# Patient Record
Sex: FEMALE | URBAN - METROPOLITAN AREA
[De-identification: names, ages, dates, MRNs, and addresses within clinical notes are randomized per-mention and may not be internally consistent; named-entity substitution may affect disease eponyms.]

---

## 2023-12-04 ENCOUNTER — OFFICE (OUTPATIENT)
Dept: URBAN - METROPOLITAN AREA CLINIC 113 | Facility: CLINIC | Age: 88
Setting detail: OPHTHALMOLOGY
End: 2023-12-04
Payer: MEDICARE

## 2023-12-04 DIAGNOSIS — H25.13: ICD-10-CM

## 2023-12-04 DIAGNOSIS — H35.40: ICD-10-CM

## 2023-12-04 PROCEDURE — 99204 OFFICE O/P NEW MOD 45 MIN: CPT | Performed by: STUDENT IN AN ORGANIZED HEALTH CARE EDUCATION/TRAINING PROGRAM

## 2023-12-04 ASSESSMENT — REFRACTION_MANIFEST
OD_CYLINDER: -0.75
OS_CYLINDER: -3.00
OS_AXIS: 088
OD_VA1: 20/NI
OD_AXIS: 124
OS_VA1: 20/NI
OS_SPHERE: +1.50
OD_SPHERE: +0.75

## 2023-12-04 ASSESSMENT — SPHEQUIV_DERIVED
OS_SPHEQUIV: 0
OD_SPHEQUIV: 0.375
OS_SPHEQUIV: 0
OD_SPHEQUIV: 0.375

## 2023-12-04 ASSESSMENT — REFRACTION_AUTOREFRACTION
OD_CYLINDER: -0.75
OS_AXIS: 088
OD_SPHERE: +0.75
OD_AXIS: 124
OS_SPHERE: +1.50
OS_CYLINDER: -3.00

## 2023-12-04 ASSESSMENT — CONFRONTATIONAL VISUAL FIELD TEST (CVF)
OD_FINDINGS: FULL
OS_FINDINGS: FULL

## 2024-01-24 ENCOUNTER — OFFICE (OUTPATIENT)
Dept: URBAN - METROPOLITAN AREA CLINIC 105 | Facility: CLINIC | Age: 89
Setting detail: OPHTHALMOLOGY
End: 2024-01-24
Payer: MEDICARE

## 2024-01-24 DIAGNOSIS — H25.13: ICD-10-CM

## 2024-01-24 DIAGNOSIS — H25.12: ICD-10-CM

## 2024-01-24 PROCEDURE — 92136 OPHTHALMIC BIOMETRY: CPT | Mod: LT | Performed by: STUDENT IN AN ORGANIZED HEALTH CARE EDUCATION/TRAINING PROGRAM

## 2024-01-24 PROCEDURE — 92136 OPHTHALMIC BIOMETRY: CPT | Mod: TC | Performed by: STUDENT IN AN ORGANIZED HEALTH CARE EDUCATION/TRAINING PROGRAM

## 2024-01-24 PROCEDURE — 99213 OFFICE O/P EST LOW 20 MIN: CPT | Performed by: STUDENT IN AN ORGANIZED HEALTH CARE EDUCATION/TRAINING PROGRAM

## 2024-01-24 ASSESSMENT — REFRACTION_MANIFEST
OD_AXIS: 124
OS_CYLINDER: -3.00
OD_CYLINDER: -0.75
OS_SPHERE: +1.50
OS_VA1: 20/NI
OD_SPHERE: +0.75
OD_VA1: 20/NI
OS_AXIS: 088

## 2024-01-24 ASSESSMENT — REFRACTION_AUTOREFRACTION
OS_AXIS: 091
OD_SPHERE: ERR
OS_SPHERE: +1.50
OS_CYLINDER: -3.00

## 2024-01-24 ASSESSMENT — CONFRONTATIONAL VISUAL FIELD TEST (CVF)
OS_FINDINGS: FULL
OD_FINDINGS: FULL

## 2024-01-24 ASSESSMENT — SPHEQUIV_DERIVED
OS_SPHEQUIV: 0
OD_SPHEQUIV: 0.375
OS_SPHEQUIV: 0

## 2024-02-12 ENCOUNTER — ASC (OUTPATIENT)
Dept: URBAN - METROPOLITAN AREA SURGERY 8 | Facility: SURGERY | Age: 89
Setting detail: OPHTHALMOLOGY
End: 2024-02-12
Payer: MEDICARE

## 2024-02-12 DIAGNOSIS — H52.212: ICD-10-CM

## 2024-02-12 DIAGNOSIS — H25.12: ICD-10-CM

## 2024-02-12 DIAGNOSIS — H57.03: ICD-10-CM

## 2024-02-12 PROCEDURE — 66982 XCAPSL CTRC RMVL CPLX WO ECP: CPT | Mod: LT | Performed by: STUDENT IN AN ORGANIZED HEALTH CARE EDUCATION/TRAINING PROGRAM

## 2024-02-12 PROCEDURE — FEMTO FEMTOSECOND LASER: Mod: GY | Performed by: STUDENT IN AN ORGANIZED HEALTH CARE EDUCATION/TRAINING PROGRAM

## 2024-02-13 ENCOUNTER — RX ONLY (RX ONLY)
Age: 89
End: 2024-02-13

## 2024-02-13 ENCOUNTER — OFFICE (OUTPATIENT)
Dept: URBAN - METROPOLITAN AREA CLINIC 113 | Facility: CLINIC | Age: 89
Setting detail: OPHTHALMOLOGY
End: 2024-02-13
Payer: MEDICARE

## 2024-02-13 DIAGNOSIS — Z96.1: ICD-10-CM

## 2024-02-13 PROCEDURE — 99024 POSTOP FOLLOW-UP VISIT: CPT | Performed by: STUDENT IN AN ORGANIZED HEALTH CARE EDUCATION/TRAINING PROGRAM

## 2024-02-13 ASSESSMENT — REFRACTION_MANIFEST
OD_SPHERE: +0.75
OD_VA1: 20/NI
OS_AXIS: 088
OD_AXIS: 124
OD_CYLINDER: -0.75
OS_CYLINDER: -3.00
OS_SPHERE: +1.50
OS_VA1: 20/NI

## 2024-02-13 ASSESSMENT — REFRACTION_AUTOREFRACTION
OS_AXIS: 092
OS_CYLINDER: -3.50
OS_SPHERE: +1.00
OD_SPHERE: +24.75
OD_CYLINDER: SPHERE

## 2024-02-13 ASSESSMENT — CONFRONTATIONAL VISUAL FIELD TEST (CVF)
OD_FINDINGS: FULL
OS_FINDINGS: FULL

## 2024-02-13 ASSESSMENT — SPHEQUIV_DERIVED
OD_SPHEQUIV: 0.375
OS_SPHEQUIV: 0
OS_SPHEQUIV: -0.75

## 2024-02-13 ASSESSMENT — CORNEAL EDEMA CLINICAL DESCRIPTION: OS_CORNEALEDEMA: 3+

## 2024-02-13 ASSESSMENT — CORNEAL EDEMA - FOLDS/STRIAE: OS_FOLDSSTRIAE: 3+

## 2024-02-20 ENCOUNTER — OFFICE (OUTPATIENT)
Dept: URBAN - METROPOLITAN AREA CLINIC 113 | Facility: CLINIC | Age: 89
Setting detail: OPHTHALMOLOGY
End: 2024-02-20
Payer: MEDICARE

## 2024-02-20 DIAGNOSIS — Z96.1: ICD-10-CM

## 2024-02-20 DIAGNOSIS — H25.11: ICD-10-CM

## 2024-02-20 PROCEDURE — 92136 OPHTHALMIC BIOMETRY: CPT | Mod: 26,RT | Performed by: OPTOMETRIST

## 2024-02-20 PROCEDURE — 99024 POSTOP FOLLOW-UP VISIT: CPT | Performed by: OPTOMETRIST

## 2024-02-20 ASSESSMENT — SPHEQUIV_DERIVED
OS_SPHEQUIV: 0
OD_SPHEQUIV: 0.375
OS_SPHEQUIV: -0.375

## 2024-02-20 ASSESSMENT — REFRACTION_MANIFEST
OS_CYLINDER: -3.00
OD_VA1: 20/NI
OS_VA1: 20/NI
OS_AXIS: 088
OD_CYLINDER: -0.75
OS_SPHERE: +1.50
OD_AXIS: 124
OD_SPHERE: +0.75

## 2024-02-20 ASSESSMENT — REFRACTION_AUTOREFRACTION
OD_AXIS: 000
OS_CYLINDER: -1.75
OD_SPHERE: +24.75
OS_SPHERE: +0.50
OD_CYLINDER: SPH
OS_AXIS: 077

## 2024-02-20 ASSESSMENT — CORNEAL EDEMA CLINICAL DESCRIPTION: OS_CORNEALEDEMA: 1+

## 2024-02-20 ASSESSMENT — CONFRONTATIONAL VISUAL FIELD TEST (CVF)
OS_FINDINGS: FULL
OD_FINDINGS: FULL

## 2024-02-20 ASSESSMENT — CORNEAL EDEMA - FOLDS/STRIAE: OS_FOLDSSTRIAE: 1+

## 2024-02-26 ENCOUNTER — ASC (OUTPATIENT)
Dept: URBAN - METROPOLITAN AREA SURGERY 8 | Facility: SURGERY | Age: 89
Setting detail: OPHTHALMOLOGY
End: 2024-02-26
Payer: MEDICARE

## 2024-02-26 DIAGNOSIS — H57.03: ICD-10-CM

## 2024-02-26 DIAGNOSIS — H52.211: ICD-10-CM

## 2024-02-26 DIAGNOSIS — H25.11: ICD-10-CM

## 2024-02-26 PROCEDURE — 66982 XCAPSL CTRC RMVL CPLX WO ECP: CPT | Mod: 79,RT | Performed by: STUDENT IN AN ORGANIZED HEALTH CARE EDUCATION/TRAINING PROGRAM

## 2024-02-26 PROCEDURE — FEMTO FEMTOSECOND LASER: Mod: GY | Performed by: STUDENT IN AN ORGANIZED HEALTH CARE EDUCATION/TRAINING PROGRAM

## 2024-02-27 ENCOUNTER — RX ONLY (RX ONLY)
Age: 89
End: 2024-02-27

## 2024-02-27 ENCOUNTER — OFFICE (OUTPATIENT)
Dept: URBAN - METROPOLITAN AREA CLINIC 113 | Facility: CLINIC | Age: 89
Setting detail: OPHTHALMOLOGY
End: 2024-02-27
Payer: MEDICARE

## 2024-02-27 DIAGNOSIS — Z96.1: ICD-10-CM

## 2024-02-27 PROBLEM — H25.11 CATARACT SENILE NUCLEAR SCLEROSIS; RIGHT EYE: Status: RESOLVED | Noted: 2024-02-13 | Resolved: 2024-02-27

## 2024-02-27 PROCEDURE — 99024 POSTOP FOLLOW-UP VISIT: CPT | Performed by: STUDENT IN AN ORGANIZED HEALTH CARE EDUCATION/TRAINING PROGRAM

## 2024-02-27 ASSESSMENT — REFRACTION_AUTOREFRACTION
OS_CYLINDER: -1.25
OS_SPHERE: +0.25
OS_AXIS: 090
OD_AXIS: 000
OD_CYLINDER: SPH
OD_SPHERE: +24.75

## 2024-02-27 ASSESSMENT — CORNEAL EDEMA - FOLDS/STRIAE
OD_FOLDSSTRIAE: 3+ 4+
OS_FOLDSSTRIAE: ABSENT

## 2024-02-27 ASSESSMENT — REFRACTION_MANIFEST
OD_AXIS: 124
OS_VA1: 20/NI
OS_CYLINDER: -3.00
OD_CYLINDER: -0.75
OS_AXIS: 088
OD_SPHERE: +0.75
OD_VA1: 20/NI
OS_SPHERE: +1.50

## 2024-02-27 ASSESSMENT — SPHEQUIV_DERIVED
OD_SPHEQUIV: 0.375
OS_SPHEQUIV: -0.375
OS_SPHEQUIV: 0

## 2024-02-27 ASSESSMENT — CORNEAL EDEMA CLINICAL DESCRIPTION
OD_CORNEALEDEMA: 3+ 4+
OS_CORNEALEDEMA: ABSENT

## 2024-02-27 ASSESSMENT — CONFRONTATIONAL VISUAL FIELD TEST (CVF)
OD_FINDINGS: FULL
OS_FINDINGS: FULL

## 2024-03-04 ENCOUNTER — OFFICE (OUTPATIENT)
Dept: URBAN - METROPOLITAN AREA CLINIC 113 | Facility: CLINIC | Age: 89
Setting detail: OPHTHALMOLOGY
End: 2024-03-04
Payer: MEDICARE

## 2024-03-04 DIAGNOSIS — Z96.1: ICD-10-CM

## 2024-03-04 PROCEDURE — 99024 POSTOP FOLLOW-UP VISIT: CPT | Performed by: STUDENT IN AN ORGANIZED HEALTH CARE EDUCATION/TRAINING PROGRAM

## 2024-03-04 ASSESSMENT — SPHEQUIV_DERIVED
OD_SPHEQUIV: 0.375
OS_SPHEQUIV: 0

## 2024-03-04 ASSESSMENT — REFRACTION_MANIFEST
OS_CYLINDER: -3.00
OS_SPHERE: +1.50
OS_VA1: 20/NI
OD_AXIS: 124
OD_SPHERE: +0.75
OD_VA1: 20/NI
OD_CYLINDER: -0.75
OS_AXIS: 088

## 2025-01-12 ENCOUNTER — EMERGENCY (EMERGENCY)
Facility: HOSPITAL | Age: 89
LOS: 1 days | Discharge: HOME | End: 2025-01-12
Attending: EMERGENCY MEDICINE
Payer: MEDICARE

## 2025-01-12 VITALS
TEMPERATURE: 99 F | SYSTOLIC BLOOD PRESSURE: 115 MMHG | RESPIRATION RATE: 18 BRPM | DIASTOLIC BLOOD PRESSURE: 66 MMHG | OXYGEN SATURATION: 94 % | HEART RATE: 53 BPM

## 2025-01-12 LAB
A1C WITH ESTIMATED AVERAGE GLUCOSE RESULT: 15 % — HIGH (ref 4–5.6)
ALBUMIN SERPL ELPH-MCNC: 3.7 G/DL — SIGNIFICANT CHANGE UP (ref 3.3–5.2)
ALP SERPL-CCNC: 74 U/L — SIGNIFICANT CHANGE UP (ref 40–120)
ALT FLD-CCNC: 13 U/L — SIGNIFICANT CHANGE UP
ANION GAP SERPL CALC-SCNC: 12 MMOL/L — SIGNIFICANT CHANGE UP (ref 5–17)
APPEARANCE UR: ABNORMAL
APTT BLD: 27.4 SEC — SIGNIFICANT CHANGE UP (ref 24.5–35.6)
AST SERPL-CCNC: 18 U/L — SIGNIFICANT CHANGE UP
BACTERIA # UR AUTO: ABNORMAL /HPF
BASOPHILS # BLD AUTO: 0.03 K/UL — SIGNIFICANT CHANGE UP (ref 0–0.2)
BASOPHILS NFR BLD AUTO: 0.3 % — SIGNIFICANT CHANGE UP (ref 0–2)
BILIRUB SERPL-MCNC: 0.4 MG/DL — SIGNIFICANT CHANGE UP (ref 0.4–2)
BILIRUB UR-MCNC: NEGATIVE — SIGNIFICANT CHANGE UP
BUN SERPL-MCNC: 16 MG/DL — SIGNIFICANT CHANGE UP (ref 8–20)
CALCIUM SERPL-MCNC: 9.2 MG/DL — SIGNIFICANT CHANGE UP (ref 8.4–10.5)
CAST: 0 /LPF — SIGNIFICANT CHANGE UP (ref 0–4)
CHLORIDE SERPL-SCNC: 95 MMOL/L — LOW (ref 96–108)
CO2 SERPL-SCNC: 26 MMOL/L — SIGNIFICANT CHANGE UP (ref 22–29)
COLOR SPEC: YELLOW — SIGNIFICANT CHANGE UP
CREAT SERPL-MCNC: 0.95 MG/DL — SIGNIFICANT CHANGE UP (ref 0.5–1.3)
DIFF PNL FLD: ABNORMAL
EGFR: 57 ML/MIN/1.73M2 — LOW
EOSINOPHIL # BLD AUTO: 0.03 K/UL — SIGNIFICANT CHANGE UP (ref 0–0.5)
EOSINOPHIL NFR BLD AUTO: 0.3 % — SIGNIFICANT CHANGE UP (ref 0–6)
ESTIMATED AVERAGE GLUCOSE: 384 MG/DL — HIGH (ref 68–114)
GAS PNL BLDV: SIGNIFICANT CHANGE UP
GLUCOSE SERPL-MCNC: 436 MG/DL — HIGH (ref 70–99)
GLUCOSE UR QL: >=1000 MG/DL
HCT VFR BLD CALC: 40.8 % — SIGNIFICANT CHANGE UP (ref 34.5–45)
HGB BLD-MCNC: 13.8 G/DL — SIGNIFICANT CHANGE UP (ref 11.5–15.5)
IMM GRANULOCYTES NFR BLD AUTO: 0.7 % — SIGNIFICANT CHANGE UP (ref 0–0.9)
INR BLD: 1.46 RATIO — HIGH (ref 0.85–1.16)
KETONES UR-MCNC: 40 MG/DL
LEUKOCYTE ESTERASE UR-ACNC: NEGATIVE — SIGNIFICANT CHANGE UP
LYMPHOCYTES # BLD AUTO: 1.5 K/UL — SIGNIFICANT CHANGE UP (ref 1–3.3)
LYMPHOCYTES # BLD AUTO: 15.7 % — SIGNIFICANT CHANGE UP (ref 13–44)
MCHC RBC-ENTMCNC: 32.5 PG — SIGNIFICANT CHANGE UP (ref 27–34)
MCHC RBC-ENTMCNC: 33.8 G/DL — SIGNIFICANT CHANGE UP (ref 32–36)
MCV RBC AUTO: 96.2 FL — SIGNIFICANT CHANGE UP (ref 80–100)
MONOCYTES # BLD AUTO: 0.81 K/UL — SIGNIFICANT CHANGE UP (ref 0–0.9)
MONOCYTES NFR BLD AUTO: 8.5 % — SIGNIFICANT CHANGE UP (ref 2–14)
NEUTROPHILS # BLD AUTO: 7.14 K/UL — SIGNIFICANT CHANGE UP (ref 1.8–7.4)
NEUTROPHILS NFR BLD AUTO: 74.5 % — SIGNIFICANT CHANGE UP (ref 43–77)
NITRITE UR-MCNC: NEGATIVE — SIGNIFICANT CHANGE UP
PH UR: 6.5 — SIGNIFICANT CHANGE UP (ref 5–8)
PLATELET # BLD AUTO: 200 K/UL — SIGNIFICANT CHANGE UP (ref 150–400)
POTASSIUM SERPL-MCNC: 4.8 MMOL/L — SIGNIFICANT CHANGE UP (ref 3.5–5.3)
POTASSIUM SERPL-SCNC: 4.8 MMOL/L — SIGNIFICANT CHANGE UP (ref 3.5–5.3)
PROT SERPL-MCNC: 5.8 G/DL — LOW (ref 6.6–8.7)
PROT UR-MCNC: NEGATIVE MG/DL — SIGNIFICANT CHANGE UP
PROTHROM AB SERPL-ACNC: 16.4 SEC — HIGH (ref 9.9–13.4)
RBC # BLD: 4.24 M/UL — SIGNIFICANT CHANGE UP (ref 3.8–5.2)
RBC # FLD: 14.1 % — SIGNIFICANT CHANGE UP (ref 10.3–14.5)
RBC CASTS # UR COMP ASSIST: 20 /HPF — HIGH (ref 0–4)
SODIUM SERPL-SCNC: 133 MMOL/L — LOW (ref 135–145)
SP GR SPEC: >1.03 — HIGH (ref 1–1.03)
SQUAMOUS # UR AUTO: 2 /HPF — SIGNIFICANT CHANGE UP (ref 0–5)
TROPONIN T, HIGH SENSITIVITY RESULT: 20 NG/L — SIGNIFICANT CHANGE UP (ref 0–51)
TROPONIN T, HIGH SENSITIVITY RESULT: 24 NG/L — SIGNIFICANT CHANGE UP (ref 0–51)
UROBILINOGEN FLD QL: 1 MG/DL — SIGNIFICANT CHANGE UP (ref 0.2–1)
VALPROATE SERPL-MCNC: 15.8 UG/ML — LOW (ref 50–100)
WBC # BLD: 9.58 K/UL — SIGNIFICANT CHANGE UP (ref 3.8–10.5)
WBC # FLD AUTO: 9.58 K/UL — SIGNIFICANT CHANGE UP (ref 3.8–10.5)
WBC UR QL: 2 /HPF — SIGNIFICANT CHANGE UP (ref 0–5)

## 2025-01-12 PROCEDURE — 99223 1ST HOSP IP/OBS HIGH 75: CPT | Mod: FS

## 2025-01-12 PROCEDURE — 71045 X-RAY EXAM CHEST 1 VIEW: CPT | Mod: 26

## 2025-01-12 PROCEDURE — 70450 CT HEAD/BRAIN W/O DYE: CPT | Mod: 26

## 2025-01-12 PROCEDURE — 93010 ELECTROCARDIOGRAM REPORT: CPT

## 2025-01-12 PROCEDURE — 72125 CT NECK SPINE W/O DYE: CPT | Mod: 26

## 2025-01-12 RX ORDER — DEXTROSE MONOHYDRATE 25 G/50ML
12.5 INJECTION, SOLUTION INTRAVENOUS ONCE
Refills: 0 | Status: DISCONTINUED | OUTPATIENT
Start: 2025-01-12 | End: 2025-01-20

## 2025-01-12 RX ORDER — SODIUM CHLORIDE 9 MG/ML
1000 INJECTION, SOLUTION INTRAVENOUS
Refills: 0 | Status: DISCONTINUED | OUTPATIENT
Start: 2025-01-12 | End: 2025-01-20

## 2025-01-12 RX ORDER — DEXTROSE MONOHYDRATE 25 G/50ML
25 INJECTION, SOLUTION INTRAVENOUS ONCE
Refills: 0 | Status: DISCONTINUED | OUTPATIENT
Start: 2025-01-12 | End: 2025-01-20

## 2025-01-12 RX ORDER — APIXABAN 5 MG/1
5 TABLET, FILM COATED ORAL EVERY 12 HOURS
Refills: 0 | Status: DISCONTINUED | OUTPATIENT
Start: 2025-01-12 | End: 2025-01-20

## 2025-01-12 RX ORDER — DIPHENHYDRAMINE HCL 25 MG
25 TABLET ORAL ONCE
Refills: 0 | Status: COMPLETED | OUTPATIENT
Start: 2025-01-12 | End: 2025-01-12

## 2025-01-12 RX ORDER — INSULIN LISPRO 100/ML
10 VIAL (ML) SUBCUTANEOUS ONCE
Refills: 0 | Status: COMPLETED | OUTPATIENT
Start: 2025-01-12 | End: 2025-01-12

## 2025-01-12 RX ORDER — INSULIN GLARGINE-YFGN 100 [IU]/ML
15 INJECTION, SOLUTION SUBCUTANEOUS AT BEDTIME
Refills: 0 | Status: DISCONTINUED | OUTPATIENT
Start: 2025-01-12 | End: 2025-01-20

## 2025-01-12 RX ORDER — METFORMIN 850 MG/1
500 TABLET ORAL ONCE
Refills: 0 | Status: COMPLETED | OUTPATIENT
Start: 2025-01-12 | End: 2025-01-12

## 2025-01-12 RX ORDER — DONEPEZIL HYDROCHLORIDE 5 MG/1
10 TABLET, FILM COATED ORAL AT BEDTIME
Refills: 0 | Status: DISCONTINUED | OUTPATIENT
Start: 2025-01-12 | End: 2025-01-20

## 2025-01-12 RX ORDER — CARVEDILOL 25 MG/1
3.12 TABLET, FILM COATED ORAL EVERY 12 HOURS
Refills: 0 | Status: DISCONTINUED | OUTPATIENT
Start: 2025-01-12 | End: 2025-01-20

## 2025-01-12 RX ORDER — INSULIN HUMAN 100 [IU]/ML
6 INJECTION, SOLUTION SUBCUTANEOUS ONCE
Refills: 0 | Status: COMPLETED | OUTPATIENT
Start: 2025-01-12 | End: 2025-01-12

## 2025-01-12 RX ORDER — SODIUM CHLORIDE 9 MG/ML
500 INJECTION, SOLUTION INTRAMUSCULAR; INTRAVENOUS; SUBCUTANEOUS ONCE
Refills: 0 | Status: COMPLETED | OUTPATIENT
Start: 2025-01-12 | End: 2025-01-12

## 2025-01-12 RX ORDER — DEXTROSE MONOHYDRATE 25 G/50ML
15 INJECTION, SOLUTION INTRAVENOUS ONCE
Refills: 0 | Status: DISCONTINUED | OUTPATIENT
Start: 2025-01-12 | End: 2025-01-20

## 2025-01-12 RX ORDER — GLUCAGON INJECTION, SOLUTION 0.5 MG/.1ML
1 INJECTION, SOLUTION SUBCUTANEOUS ONCE
Refills: 0 | Status: DISCONTINUED | OUTPATIENT
Start: 2025-01-12 | End: 2025-01-20

## 2025-01-12 RX ORDER — DIVALPROEX SODIUM 500 MG/1
250 TABLET, DELAYED RELEASE ORAL DAILY
Refills: 0 | Status: DISCONTINUED | OUTPATIENT
Start: 2025-01-12 | End: 2025-01-20

## 2025-01-12 RX ORDER — DIVALPROEX SODIUM 500 MG/1
250 TABLET, DELAYED RELEASE ORAL ONCE
Refills: 0 | Status: COMPLETED | OUTPATIENT
Start: 2025-01-12 | End: 2025-01-12

## 2025-01-12 RX ADMIN — DIVALPROEX SODIUM 250 MILLIGRAM(S): 500 TABLET, DELAYED RELEASE ORAL at 20:34

## 2025-01-12 RX ADMIN — DONEPEZIL HYDROCHLORIDE 10 MILLIGRAM(S): 5 TABLET, FILM COATED ORAL at 23:01

## 2025-01-12 RX ADMIN — METFORMIN 500 MILLIGRAM(S): 850 TABLET ORAL at 19:31

## 2025-01-12 RX ADMIN — Medication 25 MILLIGRAM(S): at 20:35

## 2025-01-12 RX ADMIN — Medication 10 UNIT(S): at 19:17

## 2025-01-12 RX ADMIN — INSULIN HUMAN 6 UNIT(S): 100 INJECTION, SOLUTION SUBCUTANEOUS at 17:42

## 2025-01-12 RX ADMIN — SODIUM CHLORIDE 500 MILLILITER(S): 9 INJECTION, SOLUTION INTRAMUSCULAR; INTRAVENOUS; SUBCUTANEOUS at 15:02

## 2025-01-12 RX ADMIN — INSULIN GLARGINE-YFGN 15 UNIT(S): 100 INJECTION, SOLUTION SUBCUTANEOUS at 23:01

## 2025-01-12 RX ADMIN — SODIUM CHLORIDE 500 MILLILITER(S): 9 INJECTION, SOLUTION INTRAMUSCULAR; INTRAVENOUS; SUBCUTANEOUS at 17:04

## 2025-01-12 NOTE — ED ADULT NURSE REASSESSMENT NOTE - NS ED NURSE REASSESS COMMENT FT1
Assumed care from RN . Patient is alert and oriented x0-1 with baseline confusion. Patient was uncooperative and attempting to get out of bed. Patient pulled IV out of right arm. CHARISMA Álvarez and Charge nurse notified, safety sit now in place. Patient now resting comfortably in bed. No acute distress noted. No additional orders at this time. will reassess BGL at 0030. NSR lead II. Primafit placed on patient and attached to suction cannister. Awaiting PT evaluation.

## 2025-01-12 NOTE — ED PROVIDER NOTE - ATTENDING CONTRIBUTION TO CARE
I, Saroj Guo, performed a face to face bedside interview with this patient regarding history of present illness, and completed an independent physical examination. I personally made/approved the management plan and take responsibility for the patient management. I have communicated the patient’s plan of care and disposition with the resident.  89 year old female with PMH dementia, afib on eliquis, hypothyroid presents with fall. As per daughter, opt was walking out of bathroom when her legs buckled and gave out on her. Daughter guadalupe snot believe that the pt hit her head. No LOC, chest pain, palpitations. pt currently with no complaints  Gen: NAD, well appearing  CV: RRR  Pul: CTA b/l  Abd: Soft, non-distended, non-tender  Neuro: no focal deficits  Pt found to have uncontrolled hyperglycemia without signs of DKA, placed on obs for glycemic control

## 2025-01-12 NOTE — ED CDU PROVIDER INITIAL DAY NOTE - DETAILS
Pt living in assisted living presents tot he ED for mechanical trip and fall with no acute injury, found to be hyperglycemic Hx of DMII that she has not been on meds for the last 5 yrs, PT placed in obs for dc planing and initiation of insulin.

## 2025-01-12 NOTE — ED ADULT NURSE REASSESSMENT NOTE - NS ED NURSE REASSESS COMMENT FT1
Pt report given to Emanuel ORTEGA. Pt moved from a11r to a7r. Pt on cardiac monitor with updated VS. Pt plan of care ongoing.

## 2025-01-12 NOTE — ED PROVIDER NOTE - PHYSICAL EXAMINATION
Good postoperative appearance. VITALS: reviewed  GEN: No apparent distress  HEAD/EYES: NC/AT, PERRL, EOMI, anicteric sclerae, no conjunctival pallor  ENT: mucus membranes moist, trachea midline, no JVD, neck is supple  RESP: lungs CTA with equal breath sounds bilaterally, chest wall nontender and atraumatic  CV: heart with reg rhythm S1, S2, no murmur; distal pulses intact and symmetric bilaterally  ABDOMEN: normoactive bowel sounds, soft, nondistended, nontender, no palpable masses  : no CVAT  MSK: extremities atraumatic and nontender, no edema, no asymmetry. the back is without midline or lateral tenderness, there is no spinal deformity or stepoff and the back is ranged painlessly. the neck has no midline tenderness, deformity, or stepoff, and is ranged painlessly.  SKIN: warm, dry, no rash, no bruising, no cyanosis.  NEURO: alert, mentating appropriately, no facial asymmetry. gross sensation, motor, coordination are intact  PSYCH: Affect appropriate

## 2025-01-12 NOTE — ED ADULT NURSE NOTE - OBJECTIVE STATEMENT
Pt received A&Ox3, baseline dementia, at baseline. As per daughter, pt has witnessed fall at NH. Daughter states pt was tired and legs gave out. Denies head strike or LOC. + Xarelto. Respirations even & unlabored. NAD. Pt made aware of plan of care and verbalized understanding.

## 2025-01-12 NOTE — ED ADULT TRIAGE NOTE - CHIEF COMPLAINT QUOTE
Pt with PMHx dementia BIBEMS from home s/p witnessed fall from standing height. Pt does not remember the fall but is c/o neck pain, c-collar placed PTA. On Eliquis but daughter denies head strike and LOC.

## 2025-01-12 NOTE — ED CDU PROVIDER INITIAL DAY NOTE - OBJECTIVE STATEMENT
A 89 yo F with pmh hypothyroid, Afib on Eliquis, prediabetes, HTN, presents to the ED for witnessed fall from standing and hit bilateral knee on the floor. Pt is from [facility], Pt is using a walker usually. Daughter who witnessed the episode today reports that Pt walked to bathroom without a walker, became dizzy, and fall as above. Daughter reports Pt did not hit head or hit back because she caught Pt. Denies fevers, chills, headache, chest pain, palpitations, shortness of breath, cough, nausea, vomiting, diarrhea, hematuria, dysuria, dark stools, focal neurologic symptoms.

## 2025-01-12 NOTE — ED CDU PROVIDER INITIAL DAY NOTE - ATTENDING APP SHARED VISIT CONTRIBUTION OF CARE
Pt living in assisted living presents tot he ED for mechanical trip and fall with no acute injury, found to be hyperglycemic Hx of DMII that she has not been on meds for the last 5 yrs, PT placed in obs for dc planing and initiation of insulin. pe awake alert heent neck supple cor s1 s2 lung clear abd soft nontender neuro nonfocal dx hyperglycemia; endocrine consult

## 2025-01-12 NOTE — ED PROVIDER NOTE - OBJECTIVE STATEMENT
A 89 yo F with pmh hypothyroid, previously Afib, currently on Eliquis, prediabetes, HTN, presents to the ED for witnessed fall from standing and hit bilateral knee on the floor. A 89 yo F with pmh hypothyroid, previously Afib, currently on Eliquis, prediabetes, HTN, presents to the ED for witnessed fall from standing and hit bilateral knee on the floor. Pt is from [facility], Pt is using a walker usually. Daughter who witnessed the episode today reports that Pt walked to bathroom without a walker, became dizzy, and fall as above. Daughter reports Pt did not hit head or hit back because she caught Pt. Denies fevers, chills, headache, chest pain, palpitations, shortness of breath, cough, nausea, vomiting, diarrhea, hematuria, dysuria, dark stools, focal neurologic symptoms. A 89 yo F with pmh hypothyroid, Afib on Eliquis, prediabetes, HTN, presents to the ED for witnessed fall from standing and hit bilateral knee on the floor. Pt is from [facility], Pt is using a walker usually. Daughter who witnessed the episode today reports that Pt walked to bathroom without a walker, became dizzy, and fall as above. Daughter reports Pt did not hit head or hit back because she caught Pt. Denies fevers, chills, headache, chest pain, palpitations, shortness of breath, cough, nausea, vomiting, diarrhea, hematuria, dysuria, dark stools, focal neurologic symptoms.

## 2025-01-12 NOTE — ED PROVIDER NOTE - CLINICAL SUMMARY MEDICAL DECISION MAKING FREE TEXT BOX
A 89 yo F with pmh hypothyroid, previously Afib, currently on Eliquis, prediabetes, HTN, presents to the ED for witnessed fall from standing and hit bilateral knee on the floor. Pt is from [facility], Pt is using a walker usually. Daughter who witnessed the episode today reports that Pt walked to bathroom without a walker, became dizzy, and fall as above. Daughter reports Pt did not hit head or hit back because she caught Pt. Denies fevers, chills, headache, chest pain, palpitations, shortness of breath, cough, nausea, vomiting, diarrhea, hematuria, dysuria, dark stools, focal neurologic symptoms.  Will check CTH c-spine. Will check cbc, cmp, Trop T, coags, ECG, CXR. Reassess.

## 2025-01-12 NOTE — ED CDU PROVIDER INITIAL DAY NOTE - CLINICAL SUMMARY MEDICAL DECISION MAKING FREE TEXT BOX
PT placed in OBS, has had no acute incidents or complaints while in CDU PT is stable. PT Placed in OBS for DC planing, Initiation of insulin

## 2025-01-12 NOTE — ED CDU PROVIDER INITIAL DAY NOTE - PHYSICAL EXAMINATION
VITALS: reviewed  GEN: No apparent distress  HEAD/EYES: NC/AT, PERRL, EOMI, anicteric sclerae, no conjunctival pallor  ENT: mucus membranes moist, trachea midline, no JVD, neck is supple  RESP: lungs CTA with equal breath sounds bilaterally, chest wall nontender and atraumatic  CV: heart with reg rhythm S1, S2, no murmur; distal pulses intact and symmetric bilaterally  ABDOMEN: normoactive bowel sounds, soft, nondistended, nontender, no palpable masses  : no CVAT  MSK: extremities atraumatic and nontender, no edema, no asymmetry. the back is without midline or lateral tenderness, there is no spinal deformity or stepoff and the back is ranged painlessly. the neck has no midline tenderness, deformity, or stepoff, and is ranged painlessly.  SKIN: warm, dry, no rash, no bruising, no cyanosis.  NEURO: alert, mentating appropriately, no facial asymmetry. gross sensation, motor, coordination are intact  PSYCH: Affect appropriate

## 2025-01-13 LAB
GLUCOSE BLDC GLUCOMTR-MCNC: 162 MG/DL — HIGH (ref 70–99)
GLUCOSE BLDC GLUCOMTR-MCNC: 197 MG/DL — HIGH (ref 70–99)
GLUCOSE BLDC GLUCOMTR-MCNC: 280 MG/DL — HIGH (ref 70–99)
GLUCOSE BLDC GLUCOMTR-MCNC: 289 MG/DL — HIGH (ref 70–99)

## 2025-01-13 PROCEDURE — 99232 SBSQ HOSP IP/OBS MODERATE 35: CPT | Mod: FS

## 2025-01-13 PROCEDURE — 99222 1ST HOSP IP/OBS MODERATE 55: CPT

## 2025-01-13 RX ORDER — INSULIN LISPRO 100/ML
0 VIAL (ML) SUBCUTANEOUS
Qty: 100 | Refills: 1
Start: 2025-01-13

## 2025-01-13 RX ORDER — DIVALPROEX SODIUM 500 MG/1
1 TABLET, DELAYED RELEASE ORAL
Refills: 0 | DISCHARGE

## 2025-01-13 RX ORDER — ACETAMINOPHEN 80 MG/.8ML
2 SOLUTION/ DROPS ORAL
Refills: 0 | DISCHARGE

## 2025-01-13 RX ORDER — ISOPROPYL ALCOHOL, BENZOCAINE .7; .06 ML/ML; ML/ML
0 SWAB TOPICAL
Qty: 100 | Refills: 1
Start: 2025-01-13

## 2025-01-13 RX ORDER — INSULIN LISPRO 100/ML
VIAL (ML) SUBCUTANEOUS
Refills: 0 | Status: DISCONTINUED | OUTPATIENT
Start: 2025-01-13 | End: 2025-01-20

## 2025-01-13 RX ORDER — VITAMIN A 10000 UNIT
1 TABLET ORAL
Refills: 0 | DISCHARGE

## 2025-01-13 RX ORDER — INSULIN LISPRO 100/ML
0 VIAL (ML) SUBCUTANEOUS
Qty: 100 | Refills: 1
Start: 2025-01-13 | End: 2025-03-13

## 2025-01-13 RX ORDER — CYANOCOBALAMIN 1000 UG/ML
1 INJECTION, SOLUTION INTRAMUSCULAR; SUBCUTANEOUS
Refills: 0 | DISCHARGE

## 2025-01-13 RX ORDER — INSULIN GLARGINE-YFGN 100 [IU]/ML
15 INJECTION, SOLUTION SUBCUTANEOUS
Qty: 100 | Refills: 1
Start: 2025-01-13

## 2025-01-13 RX ORDER — LACTOBACILLUS ACIDOPHILUS/PECT 75 MM-100
1 CAPSULE ORAL
Refills: 0 | DISCHARGE

## 2025-01-13 RX ORDER — LEVOTHYROXINE SODIUM 175 UG/1
1 TABLET ORAL
Refills: 0 | DISCHARGE

## 2025-01-13 RX ORDER — APIXABAN 5 MG/1
1 TABLET, FILM COATED ORAL
Refills: 0 | DISCHARGE

## 2025-01-13 RX ORDER — INSULIN GLARGINE-YFGN 100 [IU]/ML
0 INJECTION, SOLUTION SUBCUTANEOUS
Qty: 100 | Refills: 1
Start: 2025-01-13

## 2025-01-13 RX ORDER — CHOLECALCIFEROL (VITAMIN D3) 10 MCG
1 TABLET ORAL
Refills: 0 | DISCHARGE

## 2025-01-13 RX ORDER — CARVEDILOL 25 MG/1
1 TABLET, FILM COATED ORAL
Refills: 0 | DISCHARGE

## 2025-01-13 RX ORDER — DONEPEZIL HYDROCHLORIDE 5 MG/1
1 TABLET, FILM COATED ORAL
Refills: 0 | DISCHARGE

## 2025-01-13 RX ADMIN — APIXABAN 5 MILLIGRAM(S): 5 TABLET, FILM COATED ORAL at 17:06

## 2025-01-13 RX ADMIN — Medication 20 MILLIGRAM(S): at 11:46

## 2025-01-13 RX ADMIN — CARVEDILOL 3.12 MILLIGRAM(S): 25 TABLET, FILM COATED ORAL at 06:56

## 2025-01-13 RX ADMIN — DONEPEZIL HYDROCHLORIDE 10 MILLIGRAM(S): 5 TABLET, FILM COATED ORAL at 21:15

## 2025-01-13 RX ADMIN — DIVALPROEX SODIUM 250 MILLIGRAM(S): 500 TABLET, DELAYED RELEASE ORAL at 11:46

## 2025-01-13 RX ADMIN — Medication 3: at 16:21

## 2025-01-13 RX ADMIN — APIXABAN 5 MILLIGRAM(S): 5 TABLET, FILM COATED ORAL at 06:56

## 2025-01-13 RX ADMIN — CARVEDILOL 3.12 MILLIGRAM(S): 25 TABLET, FILM COATED ORAL at 17:06

## 2025-01-13 RX ADMIN — INSULIN GLARGINE-YFGN 15 UNIT(S): 100 INJECTION, SOLUTION SUBCUTANEOUS at 21:15

## 2025-01-13 NOTE — ED ADULT NURSE REASSESSMENT NOTE - NS ED NURSE REASSESS COMMENT FT1
Assumed care of patient at 19:10 from SHANNON Mcclendon. Patient A&O x2, denies pain, no complaints of CP/SOB, no acute distress noted. Patient lying comfortably in bed, bed locked and in lowest position, safety maintained. Patient updated on plan of care. Patient remains on CM in NSR.

## 2025-01-13 NOTE — CHART NOTE - NSCHARTNOTEFT_GEN_A_CORE
WAQAS Note: WAQAS made aware by ED Provider that pt is medically stable for d/c back to the [facility] in [place]. WAQAS called the [facility] and spoke to Roseline in Nursing ([phone]). Roseline spoke to [facility] ED PA Kodi and she reports that pt cannot return to the [facility] at this time as pt was put on insulin here at [facility] and they do not have an insulin presciption for the pt. WAQAS followed up with ED PA who reports that he put prescription in to [facility] in [place] for all of pt's needed medications including insulin. WAQAS called Roseline back and Roseline reported that the [facility] only gets one delivery of medication per day at 8 PM. As per Roseline, if pt's insulin prescription is not obtained today, 1/13, at 8 PM, pt will need to stay at [facility] until tomorrow 1/14. WAQAS called [facility] ([phone]) and spoke to staff who reported that they do have insulin prescription for the pt in their records, but that it will only be delivered tomorrow, 1/14 to the [facility]. WAQAS updated ED PA with information. Pt will need to stay in [facility] ED until insulin prescription is delivered to the [facility]. WAQAS called Roseline after WAQAS spoke to the pharmacy. As per Roseline, she will call the pharmacy tomorrow and see if she can get pt's medication rush delivered. WAQAS will follow up tomorrow for d/c back to the [facility].

## 2025-01-13 NOTE — ED CDU PROVIDER SUBSEQUENT DAY NOTE - ATTENDING APP SHARED VISIT CONTRIBUTION OF CARE
PT placed in OBS, has had no acute incidents or complaints while in CDU PT is stable. PT Placed in OBS for management of hyperglicymia, and dc planing for recent mechanical fall.    I, Santo Ortiz, performed the initial face to face bedside interview with this patient regarding history of present illness, review of symptoms and relevant past medical, social and family history.  I completed an independent physical examination.  I was the initial provider who evaluated this patient. I have signed out the follow up of any pending tests (i.e. labs, radiological studies) to the ACP.  I have communicated the patient’s plan of care and disposition with the ACP. PT placed in OBS, has had no acute incidents or complaints while in CDU PT is stable. PT Placed in OBS for management of hyperglicymia, and dc planing for recent mechanical fall.    I, Santo Ortiz, performed the initial face to face bedside interview with this patient regarding history of present illness, review of symptoms and relevant past medical, social and family history.  I completed an independent physical examination.  I was the initial provider who evaluated this patient. I have signed out the follow up of any pending tests (i.e. labs, radiological studies) to the ACP.  I have communicated the patient’s plan of care and disposition with the ACP..

## 2025-01-13 NOTE — CONSULT NOTE ADULT - ASSESSMENT
89F with PMHx hypothyroid, Afib on Eliquis, prediabetes, HTN, presents to the ED for witnessed fall. Endocrine consulted for new onset diabetes, a1c 15%.    1. New onset diabetes, a1c 15%  - Glucoses improving  - Continue lantus 15 units daily and correction scale  - Will need insulin on discharge due to elevated a1c  - Monitor FS AC&HS  - To be discharged on current regimen    2. Hypothyroid  - Not on thyroid supplement, check TSH/FT4    3. Hx of atrial fibrillation  - Continue Eliquis

## 2025-01-13 NOTE — ED ADULT NURSE REASSESSMENT NOTE - NS ED NURSE REASSESS COMMENT FT1
Pt is resting in stretcher comfortably at this time. NAD. VSS. Respirations even and unlabored. Pt safety maintained. Pt denies any complaints at this time. Plan of care on going. Pt is awaiting insulin prescription to be sent to NH. Pt may be discharged tonight or tomorrow morning after prescription is sent/received.

## 2025-01-13 NOTE — ED ADULT NURSE REASSESSMENT NOTE - NS ED NURSE REASSESS COMMENT FT1
Assumed pt care at 1515. Pt is resting in stretcher comfortably at this time. NAD. VSS. Respirations even and unlabored. Pt safety maintained. Pt denies any complaints at this time. Plan of care on going. Pt awaiting disposition.

## 2025-01-13 NOTE — ED CDU PROVIDER SUBSEQUENT DAY NOTE - HISTORY
PT placed in OBS, has had no acute incidents or complaints while in CDU PT is stable. PT Placed in OBS for management of hyperglicymia, and dc planing for recent mechanical fall.

## 2025-01-13 NOTE — PHYSICAL THERAPY INITIAL EVALUATION ADULT - PERTINENT HX OF CURRENT PROBLEM, REHAB EVAL
A 89 yo F with pmh hypothyroid, Afib on Eliquis, prediabetes, HTN, presents to the ED for witnessed fall from standing and hit bilateral knee on the floor. Pt is from [facility], Pt is using a walker usually. Daughter who witnessed the episode today reports that Pt walked to bathroom without a walker, became dizzy, and fall as above. Daughter reports Pt did not hit head or hit back because she caught Pt. Denies fevers, chills, headache, chest pain, palpitations, shortness of breath, cough, nausea, vomiting, diarrhea, hematuria, dysuria, dark stools, focal neurologic symptoms.

## 2025-01-13 NOTE — ED CDU PROVIDER SUBSEQUENT DAY NOTE - PROGRESS NOTE DETAILS
seen the pt at the bed side again - pt daughter at the bed side as well. explained about the endocrine recommendation and insuline . made the C/M aware since pt is from [facility] - to contact NH for possible d.c the pt called and Spoke with RN Chely RN At [phone] . requested  free style liber and insulin sent to [facility]. that insulin sent . they are waiting the medication get delivered to the NH. unable to sent the insulin [facility] depend on [facility] . peggy roman Received during sign out. Patient in NAD. Respirations even and unlabored. Resting comfortably. Received call from WAQAS Grey. Insulin will not be able to be delivered from pharmacy until tomorrow. Anticipate morning discharge.

## 2025-01-13 NOTE — ED ADULT NURSE REASSESSMENT NOTE - NS ED NURSE REASSESS COMMENT FT1
Assumed care of patient at 07:15 from RN SANDOR Avila. Charting as noted. Patient AA&Ox4, resp even/unlabored, presented to ED s/p witnessed fall. At time of assessment, patient denies pain/discomfort, denies CP/SOB. Patient updated on the plan of care, awaiting physical therapy evaluation. Stretcher locked in lowest position, IV site flushed w/ NS. No redness, swelling or pain noted to site. No signs of acute distress noted, safety maintained, safety sitter at bed side. Pt remains on CM in NSR, rate 70, SpO2 98% on room air. Assumed care of patient at 07:15 from RN SANDOR Avila. Charting as noted. Patient AA&Ox2-3 with periods of confusion, resp even/unlabored, presented to ED s/p witnessed fall. At time of assessment, patient denies pain/discomfort, denies CP/SOB. Patient updated on the plan of care, awaiting physical therapy evaluation. Stretcher locked in lowest position. Pt received without IV access s/p self removing, per MD Walton and CHARISMA Timmons, IV access not necessary at this time. No signs of acute distress noted, safety maintained, safety sitter at bed side. Pt remains on CM in NSR, rate 70, SpO2 98% on room air. Assumed care of patient at 07:15 from RN SANDOR Avila. Charting as noted. Patient AA&Ox1-2 with periods of confusion, resp even/unlabored, presented to ED s/p witnessed fall. At time of assessment, patient denies pain/discomfort, denies CP/SOB. Patient updated on the plan of care, awaiting physical therapy evaluation. Stretcher locked in lowest position. Pt received without IV access s/p self removing, per MD Walton and CHARISMA Timmons, IV access not necessary at this time. No signs of acute distress noted, safety maintained, safety sitter at bed side. Pt remains on CM in NSR, rate 70, SpO2 98% on room air.

## 2025-01-13 NOTE — PHYSICAL THERAPY INITIAL EVALUATION ADULT - ADDITIONAL COMMENTS
pt lives at the [facility] living where she amb with a RW per chart. pt is an unreliable historian as she reported she was at home in [place], [place].

## 2025-01-13 NOTE — ED ADULT NURSE REASSESSMENT NOTE - NS ED NURSE REASSESS COMMENT FT1
Patient is calm and resting comfortably in bed. No acute distress noted. Safety sit in place. NSR lead II. Primafit in place. Awaiting Pt evaluation and SW consult.

## 2025-01-13 NOTE — ED ADULT NURSE REASSESSMENT NOTE - NS ED NURSE REASSESS COMMENT FT1
Patient is calm and resting comfortably in bed. No acute distress noted.  at 0030. PA Himawdestiny notified. Will repeat at 0330. CHARISMA Rabagowdestiny advised to not attempt reinsertion of IV to avoid agitation. Safety sit in place. Primafit in place. Awaiting Pt evaluation and SW consult.

## 2025-01-13 NOTE — CONSULT NOTE ADULT - SUBJECTIVE AND OBJECTIVE BOX
HPI:  89 yo F with pmh hypothyroid, Afib on Eliquis, prediabetes, HTN, presents to the ED for witnessed fall from standing and hit bilateral knee on the floor. Pt is from [facility], Pt is using a walker usually. Daughter who witnessed the episode today reports that Pt walked to bathroom without a walker, became dizzy, and fall as above.    Consult for new onset diabetes, a1c 15%  Daughter endorses pt had diabetes a long time ago, then was told to stop treatment because it improved  Was not on diabetic medications recently     PAST MEDICAL & SURGICAL HISTORY:  Prediabetes  Hypothyroid  HTN (hypertension)    FAMILY HISTORY:  Diabetes in father    REVIEW OF SYSTEMS:  ROS negative    MEDICATIONS  (STANDING):  apixaban 5 milliGRAM(s) Oral every 12 hours  carvedilol 3.125 milliGRAM(s) Oral every 12 hours  dextrose 5%. 1000 milliLiter(s) (50 mL/Hr) IV Continuous <Continuous>  dextrose 5%. 1000 milliLiter(s) (100 mL/Hr) IV Continuous <Continuous>  dextrose 50% Injectable 25 Gram(s) IV Push once  dextrose 50% Injectable 12.5 Gram(s) IV Push once  dextrose 50% Injectable 25 Gram(s) IV Push once  divalproex  milliGRAM(s) Oral daily  donepezil 10 milliGRAM(s) Oral at bedtime  FLUoxetine 20 milliGRAM(s) Oral daily  glucagon  Injectable 1 milliGRAM(s) IntraMuscular once  insulin glargine Injectable (LANTUS) 15 Unit(s) SubCutaneous at bedtime    MEDICATIONS  (PRN):  dextrose Oral Gel 15 Gram(s) Oral once PRN Blood Glucose LESS THAN 70 milliGRAM(s)/deciliter    Allergies  No Known Allergies    PHYSICAL EXAM:  General: No apparent distress  Respiratory: Lungs clear bilaterally  Cardiac: +S1, S2, no m/r/g  GI: +BS, soft, + distended  Extremities: No peripheral edema  Neuro: Opens eyes, follows commands    Vital Signs Last 24 Hrs  T(C): 37 (13 Jan 2025 11:21), Max: 37 (13 Jan 2025 05:33)  T(F): 98.6 (13 Jan 2025 11:21), Max: 98.6 (13 Jan 2025 05:33)  HR: 75 (13 Jan 2025 11:21) (71 - 91)  BP: 153/82 (13 Jan 2025 11:21) (110/74 - 157/80)  BP(mean): --  RR: 18 (13 Jan 2025 11:21) (16 - 18)  SpO2: 96% (13 Jan 2025 11:21) (95% - 96%)    Parameters below as of 13 Jan 2025 11:21  Patient On (Oxygen Delivery Method): room air    LABS:                        13.8   9.58  )-----------( 200      ( 12 Jan 2025 14:54 )             40.8     01-12    133[L]  |  95[L]  |  16.0  ----------------------------<  436[H]  4.8   |  26.0  |  0.95    Ca    9.2      12 Jan 2025 14:54    TPro  5.8[L]  /  Alb  3.7  /  TBili  0.4  /  DBili  x   /  AST  18  /  ALT  13  /  AlkPhos  74  01-12    Urinalysis Basic - ( 12 Jan 2025 20:00 )    Color: Yellow / Appearance: Cloudy / SG: >1.030 / pH: x  Gluc: x / Ketone: 40 mg/dL  / Bili: Negative / Urobili: 1.0 mg/dL   Blood: x / Protein: Negative mg/dL / Nitrite: Negative   Leuk Esterase: Negative / RBC: 20 /HPF / WBC 2 /HPF   Sq Epi: x / Non Sq Epi: 2 /HPF / Bacteria: Many /HPF    LIVER FUNCTIONS - ( 12 Jan 2025 14:54 )  Alb: 3.7 g/dL / Pro: 5.8 g/dL / ALK PHOS: 74 U/L / ALT: 13 U/L / AST: 18 U/L / GGT: x           POCT Blood Glucose.: 162 mg/dL (01-13-25 @ 03:31)  POCT Blood Glucose.: 197 mg/dL (01-13-25 @ 00:31)  POCT Blood Glucose.: 479 mg/dL (01-12-25 @ 18:48)  POCT Blood Glucose.: 420 mg/dL (01-12-25 @ 17:13)

## 2025-01-14 VITALS
TEMPERATURE: 98 F | SYSTOLIC BLOOD PRESSURE: 106 MMHG | HEART RATE: 85 BPM | OXYGEN SATURATION: 98 % | RESPIRATION RATE: 18 BRPM | DIASTOLIC BLOOD PRESSURE: 74 MMHG

## 2025-01-14 LAB
CULTURE RESULTS: SIGNIFICANT CHANGE UP
GLUCOSE BLDC GLUCOMTR-MCNC: 178 MG/DL — HIGH (ref 70–99)
GLUCOSE BLDC GLUCOMTR-MCNC: 227 MG/DL — HIGH (ref 70–99)
SPECIMEN SOURCE: SIGNIFICANT CHANGE UP
T4 FREE SERPL-MCNC: 1 NG/DL — SIGNIFICANT CHANGE UP (ref 0.9–1.7)
TSH SERPL-MCNC: 11.96 UIU/ML — HIGH (ref 0.27–4.2)

## 2025-01-14 PROCEDURE — G0378: CPT

## 2025-01-14 PROCEDURE — 70450 CT HEAD/BRAIN W/O DYE: CPT | Mod: MC

## 2025-01-14 PROCEDURE — 87086 URINE CULTURE/COLONY COUNT: CPT

## 2025-01-14 PROCEDURE — 93005 ELECTROCARDIOGRAM TRACING: CPT

## 2025-01-14 PROCEDURE — 99285 EMERGENCY DEPT VISIT HI MDM: CPT | Mod: 25

## 2025-01-14 PROCEDURE — 71045 X-RAY EXAM CHEST 1 VIEW: CPT

## 2025-01-14 PROCEDURE — 84443 ASSAY THYROID STIM HORMONE: CPT

## 2025-01-14 PROCEDURE — 36415 COLL VENOUS BLD VENIPUNCTURE: CPT

## 2025-01-14 PROCEDURE — 84439 ASSAY OF FREE THYROXINE: CPT

## 2025-01-14 PROCEDURE — 82962 GLUCOSE BLOOD TEST: CPT

## 2025-01-14 PROCEDURE — 99239 HOSP IP/OBS DSCHRG MGMT >30: CPT

## 2025-01-14 PROCEDURE — 96372 THER/PROPH/DIAG INJ SC/IM: CPT

## 2025-01-14 PROCEDURE — 72125 CT NECK SPINE W/O DYE: CPT | Mod: MC

## 2025-01-14 RX ORDER — LEVOTHYROXINE SODIUM 175 UG/1
50 TABLET ORAL DAILY
Refills: 0 | Status: DISCONTINUED | OUTPATIENT
Start: 2025-01-14 | End: 2025-01-20

## 2025-01-14 RX ORDER — INSULIN LISPRO 100/ML
3 VIAL (ML) SUBCUTANEOUS
Refills: 0 | Status: DISCONTINUED | OUTPATIENT
Start: 2025-01-14 | End: 2025-01-20

## 2025-01-14 RX ORDER — INSULIN LISPRO 100/ML
0 VIAL (ML) SUBCUTANEOUS
Qty: 1 | Refills: 0
Start: 2025-01-14

## 2025-01-14 RX ADMIN — Medication 1: at 08:13

## 2025-01-14 RX ADMIN — CARVEDILOL 3.12 MILLIGRAM(S): 25 TABLET, FILM COATED ORAL at 05:37

## 2025-01-14 RX ADMIN — Medication 20 MILLIGRAM(S): at 12:57

## 2025-01-14 RX ADMIN — Medication 3 UNIT(S): at 12:59

## 2025-01-14 RX ADMIN — DIVALPROEX SODIUM 250 MILLIGRAM(S): 500 TABLET, DELAYED RELEASE ORAL at 12:57

## 2025-01-14 RX ADMIN — Medication 2: at 13:00

## 2025-01-14 RX ADMIN — APIXABAN 5 MILLIGRAM(S): 5 TABLET, FILM COATED ORAL at 05:37

## 2025-01-14 NOTE — ED CDU PROVIDER DISPOSITION NOTE - NSFOLLOWUPINSTRUCTIONS_ED_ALL_ED_FT
Patient education: Using insulin (The Basics)  Written by the doctors and editors at [facility]  Please read the Disclaimer at the end of this page.    What is insulin?    Insulin is a medicine that many people with diabetes use as part of their treatment. Diabetes is a disorder that disrupts the way a person's body uses sugar. This causes sugar to build up in the blood.    Insulin can lower a person's blood sugar level. It usually comes as a shot that you give yourself.    Are there different types of insulin?    Yes. All types of insulin can control blood sugar levels. But some types of insulin start working faster or last longer than other types. Many people use 2 different types of insulin each day so that their body has insulin all day and night.    How many times a day should I use insulin?    It depends. Your doctor will work with you to make a treatment plan that tells you:    ?When to use insulin    ?What type of insulin to use    ?How much insulin to use    Some people use the same amount of insulin 1 or 2 times a day, at the same time each day. But many people use insulin 3 or more times a day, usually before each meal. Using insulin 3 or more times a day can control a person's blood sugar level better.    How much insulin should I use?    Sometimes, people need to choose their dose of insulin. When choosing how much insulin to use, you need to think about:    ?What you plan to eat at the next meal    ?How much exercise you plan to do    ?What your blood sugar level is    Also, you will probably need to change your insulin dose if you:    ?Have surgery, get sick, or get pregnant    ?Eat out or travel    ?Gain or lose weight    Ask your doctor or nurse how to change your insulin dose during these times.    How do I give myself an insulin shot?    Your doctor or nurse will teach you how to give yourself an insulin shot. You will need either a prefilled insulin pen injector, or a needle and syringe to draw up insulin from a small bottle.    The pen injectors are easier to use than the older syringes and insulin bottles. They have prefilled insulin cartridges and a needle (figure 1). There is a knob at the end of the pen, which you can turn to evy the number of units you need to take. If you cannot turn the knob to set a dose, there is not enough insulin left in the pen. When this happens, start a new pen.    If you use a bottle and syringe, make sure that you use the correct syringe for your type of insulin. Using the wrong syringe with your insulin can cause a dangerous insulin overdose.    Whether you are using an injector pen or a syringe, you give yourself the insulin shot in the same way:    ?Choose a part of the body. You can use different parts of the body for an insulin shot (figure 2). Switch or "rotate" areas each time you give yourself a shot.    ?Pinch up some skin, and quickly insert the needle (figure 3).    ?Push the plunger down all of the way, and count to 5.    ?Let go of the skin, and remove the needle.    ?Throw away the needle in a container that is made for used needles. If you used a syringe, throw this away, too.    Never use another person's insulin pen (even if the needle is changed) or let another person use yours.    What is an insulin pump?    An insulin pump is a device that slowly releases insulin into the body. The insulin goes through a thin tube from the pump into the body through an opening in the skin (figure 4). The device keeps working all day and night.    What is inhaled insulin?    Inhaled insulin is an insulin powder that is breathed into the lungs. The insulin powder comes in a cartridge that can be placed into a small inhaler (like an asthma inhaler). Place the inhaler into your mouth, and when you breathe in, the powdered insulin goes into your lungs.    Most people with diabetes use insulin shots or a pump rather than inhaled insulin.    How do I know if I am using the right amount of insulin?    To know if you are using the right amount of insulin, check your blood sugar level at home. Most doctors recommend that people who use insulin with each meal check their blood sugar level at least 4 times a day.    Why do I need to check my blood sugar level?    Checking your blood sugar level is important because it can tell you:    ?If your blood sugar level gets too low or too high – If you use too much insulin, your blood sugar level can get too low. If you do not use enough insulin, your blood sugar level can get too high. Levels that are too low or too high can lead to serious problems. Talk with your doctor or nurse about what to do if your blood sugar level gets too low or too high.    ?What changes to make in your next insulin dose – Knowing your blood sugar level will help you choose your next insulin dose.    ?How well your treatment is working – One goal of diabetes treatment is to keep your blood sugar at or near your goal level. This can prevent health problems later in life.    How do I check my blood sugar level at home?    You can use a "blood glucose monitor" or a "continuous glucose monitor" ("CGM") to check your blood sugar level. Most people who use insulin can use either type of device. Your doctor or nurse will show you how to use your device.    In general:    ?Most blood glucose monitors work the same way. You prick your skin to get a drop of blood. Many people prick their fingertips (picture 1), but you can prick other parts of the body (picture 2). Then, put the drop of blood into the monitor. After a few seconds, the monitor shows your blood sugar level.    ?CGM devices measure your sugar level all of the time. With CGM, you wear a special sensor that attaches to your skin with a sticky patch (figure 5 and figure 6). It measures the sugar in the fluid under your skin. Then, it sends the information to a small box that can attach to your clothing or go in a bag. One type of CGM device can be placed under the skin and used for up to 6 months. For this type, a doctor does a minor procedure to insert the device.    If you use CGM, you might still need to check your blood sugar by pricking your skin. This is especially important when your sugar levels are changing quickly, or if you think that your monitor might not be working correctly. When and how often to do this depends on which device you use.

## 2025-01-14 NOTE — ED ADULT NURSE REASSESSMENT NOTE - NS ED NURSE REASSESS COMMENT FT1
Patient A&O x2, offering no complaints at this time, no acute distress noted. Patient lying in bed, safety maintained. Patient updated on plan of care. Patient remains on CM in NSR.

## 2025-01-14 NOTE — PROGRESS NOTE ADULT - SUBJECTIVE AND OBJECTIVE BOX
INTERVAL EVENTS:  Follow up diabetes management, glucoses overall improving. Pt denies acute complaints.    MEDICATIONS  (STANDING):  apixaban 5 milliGRAM(s) Oral every 12 hours  carvedilol 3.125 milliGRAM(s) Oral every 12 hours  dextrose 5%. 1000 milliLiter(s) (50 mL/Hr) IV Continuous <Continuous>  dextrose 5%. 1000 milliLiter(s) (100 mL/Hr) IV Continuous <Continuous>  dextrose 50% Injectable 25 Gram(s) IV Push once  dextrose 50% Injectable 12.5 Gram(s) IV Push once  dextrose 50% Injectable 25 Gram(s) IV Push once  divalproex  milliGRAM(s) Oral daily  donepezil 10 milliGRAM(s) Oral at bedtime  FLUoxetine 20 milliGRAM(s) Oral daily  glucagon  Injectable 1 milliGRAM(s) IntraMuscular once  insulin glargine Injectable (LANTUS) 15 Unit(s) SubCutaneous at bedtime  insulin lispro (ADMELOG) corrective regimen sliding scale   SubCutaneous three times a day before meals  insulin lispro Injectable (ADMELOG) 3 Unit(s) SubCutaneous three times a day before meals  levothyroxine 50 MICROGram(s) Oral daily    MEDICATIONS  (PRN):  dextrose Oral Gel 15 Gram(s) Oral once PRN Blood Glucose LESS THAN 70 milliGRAM(s)/deciliter    Allergies  No Known Allergies    Vital Signs Last 24 Hrs  T(C): 36.9 (14 Jan 2025 11:28), Max: 37 (13 Jan 2025 19:16)  T(F): 98.5 (14 Jan 2025 11:28), Max: 98.6 (13 Jan 2025 19:16)  HR: 68 (14 Jan 2025 11:28) (67 - 77)  BP: 139/79 (14 Jan 2025 11:28) (127/77 - 179/91)  BP(mean): --  RR: 18 (14 Jan 2025 11:28) (16 - 18)  SpO2: 97% (14 Jan 2025 11:28) (93% - 97%)    Parameters below as of 14 Jan 2025 11:28  Patient On (Oxygen Delivery Method): room air    PHYSICAL EXAM:  General: No apparent distress  Respiratory: Lungs clear bilaterally  Cardiac: +S1, S2, no m/r/g  GI: +BS, soft, non tender, non distended  Extremities: No edema  Neuro: Awake, forgetful at times     LABS:                        13.8   9.58  )-----------( 200      ( 12 Jan 2025 14:54 )             40.8     01-12    133[L]  |  95[L]  |  16.0  ----------------------------<  436[H]  4.8   |  26.0  |  0.95    Ca    9.2      12 Jan 2025 14:54    TPro  5.8[L]  /  Alb  3.7  /  TBili  0.4  /  DBili  x   /  AST  18  /  ALT  13  /  AlkPhos  74  01-12    Urinalysis Basic - ( 12 Jan 2025 20:00 )    Color: Yellow / Appearance: Cloudy / SG: >1.030 / pH: x  Gluc: x / Ketone: 40 mg/dL  / Bili: Negative / Urobili: 1.0 mg/dL   Blood: x / Protein: Negative mg/dL / Nitrite: Negative   Leuk Esterase: Negative / RBC: 20 /HPF / WBC 2 /HPF   Sq Epi: x / Non Sq Epi: 2 /HPF / Bacteria: Many /HPF    POCT Blood Glucose.: 227 mg/dL (01-14-25 @ 11:32)  POCT Blood Glucose.: 178 mg/dL (01-14-25 @ 07:54)  POCT Blood Glucose.: 289 mg/dL (01-13-25 @ 21:15)  POCT Blood Glucose.: 280 mg/dL (01-13-25 @ 16:13)    Thyroid Stimulating Hormone, Serum: 11.96 uIU/mL (01-14-25 @ 05:42)

## 2025-01-14 NOTE — PROGRESS NOTE ADULT - ASSESSMENT
89F with PMHx hypothyroid, Afib on Eliquis, prediabetes, HTN, presents to the ED for witnessed fall. Endocrine consulted for new onset diabetes, a1c 15%.    1. New onset diabetes, a1c 15%  - Post prandial hyperglycemia, start admelog 3 units TID  - Continue lantus 15 units daily  - Correction scale  - Will need insulin on discharge due to elevated a1c, can discharge on current regimen    2. Hx of Hypothyroid  - TSH elevated 11.96, FT4 pending  - Start levothyroxine 50mcg daily    3. Hx of atrial fibrillation  - Continue Eliquis

## 2025-01-14 NOTE — ED CDU PROVIDER SUBSEQUENT DAY NOTE - LIVES WITH, PROFILE
Patient requesting an increase in her depression medication.     -sertraline (ZOLOFT) 50 MG tablet    Patient was crying, transferred call to the nurse.     alone

## 2025-01-14 NOTE — ED ADULT NURSE REASSESSMENT NOTE - NS ED NURSE REASSESS COMMENT FT1
received patient A&Ox1, confused at times, resting comfortable, FS checked, Insulin given as ordered  pt using pure wick, no complaints

## 2025-01-14 NOTE — ED CDU PROVIDER SUBSEQUENT DAY NOTE - NSICDXPASTMEDICALHX_GEN_ALL_CORE_FT
PAST MEDICAL HISTORY:  HTN (hypertension)     Hypothyroid     Prediabetes     
PAST MEDICAL HISTORY:  HTN (hypertension)     Hypothyroid     Prediabetes

## 2025-01-14 NOTE — ED CDU PROVIDER SUBSEQUENT DAY NOTE - CLINICAL SUMMARY MEDICAL DECISION MAKING FREE TEXT BOX
89 yo female PMHx dementia, HTN, HLD, A-fib, remote DM placed in OBS. Patient had fall. No injuries. A1C was done and found to be elevated. Evaluated by endocrine and started on Lantus. CM following and has been in contact with facility RN to ensure medications delivered to facility. Anticipate morning discharge.

## 2025-01-14 NOTE — ED CDU PROVIDER SUBSEQUENT DAY NOTE - NS ED ATTENDING STATEMENT MOD
This was a shared visit with the YESSENIA. I reviewed and verified the documentation.
This was a shared visit with the YESSENIA. I reviewed and verified the documentation.

## 2025-01-14 NOTE — ED CDU PROVIDER DISPOSITION NOTE - CLINICAL COURSE
Pt living in assisted living presents tot he ED for mechanical trip and fall with no acute injury, found to be hyperglycemic Hx of DMII that she has not been on meds for the last 5 yrs, PT placed in obs for dc planing and initiation of insulin. Pt doing well with insulin, endocrinology recs appreciated, PT cleared by pt to return to assisted living, Pt to be dc home with meds to pharmacy, supportive care, follow up to PCP/ endocrine, educated about when to return to the ED if needed. PT verbalizes that he understands all instructions and results. Pt infomred that ED is open and availible 24/7 365 days a yr, encouraged to return to the ED if they have any change in condition, or feel the need for revaluation.

## 2025-01-14 NOTE — ED CDU PROVIDER DISPOSITION NOTE - ADDITIONAL NOTES AND INSTRUCTIONS:
PT was evaluated At [facility] ED and was found to have a condition that warranted time of to rest and heal from WORK/SCHOOL.   Lex Álvarez PA-C

## 2025-01-14 NOTE — ED CDU PROVIDER DISPOSITION NOTE - CARE PROVIDER_API CALL
Rimma Callejas  Endocrinology/Metab/Diabetes  [address] [place] [id number]  Phone: [phone]  Fax: [phone]  Follow Up Time:

## 2025-01-14 NOTE — ED CDU PROVIDER DISPOSITION NOTE - PATIENT PORTAL LINK FT
You can access the FollowMyHealth Patient Portal offered by [facility] by registering at the following website: [email or web address]. By joining [facility]’s FollowMyHealth portal, you will also be able to view your health information using other applications (apps) compatible with our system.

## 2025-01-14 NOTE — ED ADULT NURSE REASSESSMENT NOTE - NS ED NURSE REASSESS COMMENT FT1
Patient A&O x1, offering no complaints at this time, no acute distress noted. Patient updated on plan of care. Patient safety maintained.

## 2025-01-14 NOTE — ED CDU PROVIDER DISPOSITION NOTE - ATTENDING CONTRIBUTION TO CARE
Pt living in assisted living presents tot he ED for mechanical trip and fall with no acute injury, found to be hyperglycemic Hx of DMII that she has not been on meds for the last 5 yrs, PT placed in obs for dc planing and initiation of insulin. Pt doing well with insulin, endocrinology recs appreciated, PT cleared by pt to return to assisted living, Pt to be dc home with meds to pharmacy, supportive care, follow up to PCP/ endocrine, educated about when to return to the ED if needed. PT verbalizes that he understands all instructions and results. Pt infomred that ED is open and availible 24/7 365 days a yr, encouraged to return to the ED if they have any change in condition, or feel the need for revaluation.    I, Santo Ortiz, performed the initial face to face bedside interview with this patient regarding history of present illness, review of symptoms and relevant past medical, social and family history.  I completed an independent physical examination.  I was the initial provider who evaluated this patient. I have signed out the follow up of any pending tests (i.e. labs, radiological studies) to the ACP.  I have communicated the patient’s plan of care and disposition with the ACP.

## 2025-01-14 NOTE — ED CDU PROVIDER SUBSEQUENT DAY NOTE - PHYSICAL EXAMINATION
VITALS: reviewed  GEN: No apparent distress  HEAD/EYES: NC/AT, PERRL, EOMI, anicteric sclerae, no conjunctival pallor  ENT: mucus membranes moist, trachea midline, no JVD, neck is supple  RESP: lungs CTA with equal breath sounds bilaterally, chest wall nontender and atraumatic  CV: heart with reg rhythm S1, S2, no murmur; distal pulses intact and symmetric bilaterally  ABDOMEN: normoactive bowel sounds, soft, nondistended, nontender, no palpable masses  : no CVAT  MSK: extremities atraumatic and nontender, no edema, no asymmetry. the back is without midline or lateral tenderness, there is no spinal deformity or stepoff and the back is ranged painlessly. the neck has no midline tenderness, deformity, or stepoff, and is ranged painlessly.  SKIN: warm, dry, no rash, no bruising, no cyanosis.  NEURO: alert, mentating appropriately, no facial asymmetry. gross sensation, motor, coordination are intact  PSYCH: Affect appropriate
Gen: Nontoxic, well appearing, in NAD.  Skin: Warm and dry as visualized.  Head: NC/AT.  Eyes: PERRLA. EOMI.  Neck: Supple, FROM. Trachea midline.   Resp: No distress.  Cardio: Well perfused.  Ext: No deformities. MAEx4. FROM.   Neuro: A&Ox2. Appears nonfocal.   Psych: Normal affect and mood.

## 2025-01-14 NOTE — PROGRESS NOTE ADULT - NS ATTEND AMEND GEN_ALL_CORE FT
I agree with plan above and discussed with NP.  Start admelog 3 units TID. Continue lantus 15 units daily.  Correction scale

## 2025-01-14 NOTE — ED CDU PROVIDER SUBSEQUENT DAY NOTE - ATTENDING APP SHARED VISIT CONTRIBUTION OF CARE
89 yo female PMHx dementia, HTN, HLD, A-fib, remote DM placed in OBS. Patient had fall. No injuries. A1C was done and found to be elevated. Evaluated by endocrine and started on Lantus. CM following and has been in contact with facility RN to ensure medications delivered to facility. Anticipate morning discharge.    I, Santo Ortiz, performed the initial face to face bedside interview with this patient regarding history of present illness, review of symptoms and relevant past medical, social and family history.  I completed an independent physical examination.  I was the initial provider who evaluated this patient. I have signed out the follow up of any pending tests (i.e. labs, radiological studies) to the ACP.  I have communicated the patient’s plan of care and disposition with the ACP.

## 2025-01-21 PROBLEM — Z00.00 ENCOUNTER FOR PREVENTIVE HEALTH EXAMINATION: Status: ACTIVE | Noted: 2025-01-21

## 2025-01-22 PROBLEM — I10 ESSENTIAL (PRIMARY) HYPERTENSION: Chronic | Status: ACTIVE | Noted: 2025-01-12

## 2025-01-22 PROBLEM — R73.03 PREDIABETES: Chronic | Status: ACTIVE | Noted: 2025-01-12

## 2025-01-22 PROBLEM — E03.9 HYPOTHYROIDISM, UNSPECIFIED: Chronic | Status: ACTIVE | Noted: 2025-01-12

## 2025-01-30 ENCOUNTER — APPOINTMENT (OUTPATIENT)
Dept: ENDOCRINOLOGY | Facility: CLINIC | Age: 89
End: 2025-01-30
Payer: MEDICARE

## 2025-01-30 VITALS
WEIGHT: 172 LBS | OXYGEN SATURATION: 98 % | HEIGHT: 63 IN | HEART RATE: 61 BPM | SYSTOLIC BLOOD PRESSURE: 122 MMHG | DIASTOLIC BLOOD PRESSURE: 74 MMHG | BODY MASS INDEX: 30.48 KG/M2

## 2025-01-30 DIAGNOSIS — Z87.891 PERSONAL HISTORY OF NICOTINE DEPENDENCE: ICD-10-CM

## 2025-01-30 DIAGNOSIS — Z86.39 PERSONAL HISTORY OF OTHER ENDOCRINE, NUTRITIONAL AND METABOLIC DISEASE: ICD-10-CM

## 2025-01-30 DIAGNOSIS — Z86.79 PERSONAL HISTORY OF OTHER DISEASES OF THE CIRCULATORY SYSTEM: ICD-10-CM

## 2025-01-30 DIAGNOSIS — E11.65 TYPE 2 DIABETES MELLITUS WITH HYPERGLYCEMIA: ICD-10-CM

## 2025-01-30 DIAGNOSIS — F39 UNSPECIFIED MOOD [AFFECTIVE] DISORDER: ICD-10-CM

## 2025-01-30 DIAGNOSIS — Z80.0 FAMILY HISTORY OF MALIGNANT NEOPLASM OF DIGESTIVE ORGANS: ICD-10-CM

## 2025-01-30 DIAGNOSIS — E03.9 HYPOTHYROIDISM, UNSPECIFIED: ICD-10-CM

## 2025-01-30 DIAGNOSIS — Z78.9 OTHER SPECIFIED HEALTH STATUS: ICD-10-CM

## 2025-01-30 DIAGNOSIS — Z85.9 PERSONAL HISTORY OF MALIGNANT NEOPLASM, UNSPECIFIED: ICD-10-CM

## 2025-01-30 DIAGNOSIS — Z83.3 FAMILY HISTORY OF DIABETES MELLITUS: ICD-10-CM

## 2025-01-30 DIAGNOSIS — Z63.4 DISAPPEARANCE AND DEATH OF FAMILY MEMBER: ICD-10-CM

## 2025-01-30 LAB — GLUCOSE BLDC GLUCOMTR-MCNC: 201

## 2025-01-30 PROCEDURE — 95251 CONT GLUC MNTR ANALYSIS I&R: CPT

## 2025-01-30 PROCEDURE — 99214 OFFICE O/P EST MOD 30 MIN: CPT

## 2025-01-30 PROCEDURE — 82962 GLUCOSE BLOOD TEST: CPT

## 2025-01-30 RX ORDER — VITAMIN K2 90 MCG
1000 CAPSULE ORAL
Refills: 0 | Status: ACTIVE | COMMUNITY

## 2025-01-30 RX ORDER — CHLORHEXIDINE GLUCONATE 4 %
1000 LIQUID (ML) TOPICAL
Refills: 0 | Status: ACTIVE | COMMUNITY

## 2025-01-30 RX ORDER — FLASH GLUCOSE SCANNING READER
EACH MISCELLANEOUS
Refills: 0 | Status: ACTIVE | COMMUNITY

## 2025-01-30 RX ORDER — DIVALPROEX SODIUM 250 MG/1
250 TABLET, DELAYED RELEASE ORAL
Refills: 0 | Status: ACTIVE | COMMUNITY

## 2025-01-30 RX ORDER — APIXABAN 5 MG/1
5 TABLET, FILM COATED ORAL
Refills: 0 | Status: ACTIVE | COMMUNITY

## 2025-01-30 RX ORDER — ACETAMINOPHEN 325 MG/1
325 TABLET ORAL
Refills: 0 | Status: ACTIVE | COMMUNITY

## 2025-01-30 RX ORDER — FLASH GLUCOSE SENSOR
KIT MISCELLANEOUS
Refills: 0 | Status: ACTIVE | COMMUNITY

## 2025-01-30 RX ORDER — INSULIN ASPART 100 [IU]/ML
100 INJECTION, SOLUTION INTRAVENOUS; SUBCUTANEOUS
Refills: 0 | Status: ACTIVE | COMMUNITY

## 2025-01-30 RX ORDER — LEVOTHYROXINE SODIUM 100 UG/1
100 CAPSULE ORAL
Refills: 0 | Status: ACTIVE | COMMUNITY

## 2025-01-30 RX ORDER — INSULIN GLARGINE-YFGN 100 [IU]/ML
100 INJECTION, SOLUTION SUBCUTANEOUS
Refills: 0 | Status: ACTIVE | COMMUNITY

## 2025-01-30 RX ORDER — DEXTROSE 3.75 G
4 TABLET,CHEWABLE ORAL
Refills: 0 | Status: ACTIVE | COMMUNITY

## 2025-01-30 RX ORDER — CARVEDILOL 3.12 MG/1
3.12 TABLET, FILM COATED ORAL
Refills: 0 | Status: ACTIVE | COMMUNITY

## 2025-01-30 RX ORDER — DONEPEZIL HYDROCHLORIDE 10 MG/1
10 TABLET ORAL
Refills: 0 | Status: ACTIVE | COMMUNITY

## 2025-01-30 RX ORDER — FLUOXETINE 20 MG/1
20 TABLET ORAL
Refills: 0 | Status: ACTIVE | COMMUNITY

## 2025-01-30 RX ORDER — FOLIC ACID 1 MG/1
1 TABLET ORAL
Refills: 0 | Status: ACTIVE | COMMUNITY

## 2025-01-30 SDOH — SOCIAL STABILITY - SOCIAL INSECURITY: DISSAPEARANCE AND DEATH OF FAMILY MEMBER: Z63.4

## 2025-03-12 LAB
HBA1C MFR BLD HPLC: 12.1
LDLC SERPL DIRECT ASSAY-MCNC: 203
TSH SERPL-ACNC: 3.02

## 2025-03-13 ENCOUNTER — APPOINTMENT (OUTPATIENT)
Dept: ENDOCRINOLOGY | Facility: CLINIC | Age: 89
End: 2025-03-13
Payer: MEDICARE

## 2025-03-13 VITALS
HEIGHT: 63 IN | OXYGEN SATURATION: 95 % | SYSTOLIC BLOOD PRESSURE: 128 MMHG | HEART RATE: 71 BPM | DIASTOLIC BLOOD PRESSURE: 86 MMHG

## 2025-03-13 DIAGNOSIS — E03.9 HYPOTHYROIDISM, UNSPECIFIED: ICD-10-CM

## 2025-03-13 DIAGNOSIS — E11.65 TYPE 2 DIABETES MELLITUS WITH HYPERGLYCEMIA: ICD-10-CM

## 2025-03-13 LAB — GLUCOSE BLDC GLUCOMTR-MCNC: 175

## 2025-03-13 PROCEDURE — 99214 OFFICE O/P EST MOD 30 MIN: CPT

## 2025-03-13 PROCEDURE — 82962 GLUCOSE BLOOD TEST: CPT

## 2025-03-13 PROCEDURE — 95251 CONT GLUC MNTR ANALYSIS I&R: CPT

## 2025-03-13 RX ORDER — INSULIN GLARGINE 100 [IU]/ML
100 INJECTION, SOLUTION SUBCUTANEOUS DAILY
Qty: 3 | Refills: 3 | Status: ACTIVE | COMMUNITY
Start: 2025-03-13 | End: 1900-01-01

## 2025-05-01 NOTE — ED CDU PROVIDER SUBSEQUENT DAY NOTE - PROGRESS NOTE
05/02/25      Marcia Taylor  [address] [facility] [address]       Guanako Marcia,      The purpose of this letter is to introduce our Primary Care/Behavioral Health Integration Program here at the [facility]. Your primary care clinic has adopted a team approach to managing both your medical and your behavioral health needs. This team approach has been proven to be very effective in reducing hospitalizations, minimizing medical and behavioral health complications, and ultimately improving the quality of life. Your primary care provider, Devin Ernst DO  has recommended that we connect to discuss ways we can best support your health and personal wellbeing.      Your primary care team now includes a Behavioral Health Care Coordinator, Imer Delong, and a Behavioral Health Specialist, either Chika Nixon LPC or Zandra Fischer LCSW. We have specific training in assisting patients with chronic medical conditions, as well as behavioral health and substance use concerns.  We are readily available to answer your questions, monitor your health concerns, coordinate your care with additional services or community resources as needed, and to assist you in being an active participant in your own healthcare.      Should you have further questions about this service or wish to schedule an appointment, please give me a call at [phone] so I can introduce myself and discuss how we can assist you with your behavioral health and health management needs.      Kind Regards,    Imer Delong MA  Behavioral Health Care Coordinator   #[phone]      
Stable.

## 2025-06-04 ENCOUNTER — APPOINTMENT (OUTPATIENT)
Dept: ENDOCRINOLOGY | Facility: CLINIC | Age: 89
End: 2025-06-04
Payer: MEDICARE

## 2025-06-04 VITALS
SYSTOLIC BLOOD PRESSURE: 110 MMHG | BODY MASS INDEX: 30.48 KG/M2 | WEIGHT: 172 LBS | OXYGEN SATURATION: 95 % | HEIGHT: 63 IN | DIASTOLIC BLOOD PRESSURE: 70 MMHG | HEART RATE: 78 BPM

## 2025-06-04 DIAGNOSIS — E78.2 MIXED HYPERLIPIDEMIA: ICD-10-CM

## 2025-06-04 DIAGNOSIS — E03.9 HYPOTHYROIDISM, UNSPECIFIED: ICD-10-CM

## 2025-06-04 DIAGNOSIS — E11.65 TYPE 2 DIABETES MELLITUS WITH HYPERGLYCEMIA: ICD-10-CM

## 2025-06-04 LAB
GLUCOSE BLDC GLUCOMTR-MCNC: 308
HBA1C MFR BLD HPLC: 7.5
LDLC SERPL DIRECT ASSAY-MCNC: 193
TSH SERPL-ACNC: 0.27

## 2025-06-04 PROCEDURE — 82962 GLUCOSE BLOOD TEST: CPT

## 2025-06-04 PROCEDURE — 95251 CONT GLUC MNTR ANALYSIS I&R: CPT

## 2025-06-04 PROCEDURE — 99214 OFFICE O/P EST MOD 30 MIN: CPT

## 2025-08-21 ENCOUNTER — APPOINTMENT (OUTPATIENT)
Dept: ENDOCRINOLOGY | Facility: CLINIC | Age: 89
End: 2025-08-21

## 2025-09-17 ENCOUNTER — APPOINTMENT (OUTPATIENT)
Dept: ENDOCRINOLOGY | Facility: CLINIC | Age: 89
End: 2025-09-17
Payer: MEDICARE

## 2025-09-17 VITALS
HEIGHT: 63 IN | HEART RATE: 70 BPM | WEIGHT: 185 LBS | DIASTOLIC BLOOD PRESSURE: 70 MMHG | SYSTOLIC BLOOD PRESSURE: 124 MMHG | BODY MASS INDEX: 32.78 KG/M2 | OXYGEN SATURATION: 96 %

## 2025-09-17 DIAGNOSIS — E11.65 TYPE 2 DIABETES MELLITUS WITH HYPERGLYCEMIA: ICD-10-CM

## 2025-09-17 DIAGNOSIS — E78.2 MIXED HYPERLIPIDEMIA: ICD-10-CM

## 2025-09-17 DIAGNOSIS — E03.9 HYPOTHYROIDISM, UNSPECIFIED: ICD-10-CM

## 2025-09-17 LAB — GLUCOSE BLDC GLUCOMTR-MCNC: 169

## 2025-09-17 PROCEDURE — 95251 CONT GLUC MNTR ANALYSIS I&R: CPT

## 2025-09-17 PROCEDURE — 99214 OFFICE O/P EST MOD 30 MIN: CPT

## 2025-09-17 PROCEDURE — 82962 GLUCOSE BLOOD TEST: CPT

## 2025-09-17 RX ORDER — INSULIN LISPRO 100 [IU]/ML
100 INJECTION, SOLUTION INTRAVENOUS; SUBCUTANEOUS
Qty: 75 | Refills: 1 | Status: ACTIVE | COMMUNITY
Start: 2025-09-17 | End: 1900-01-01